# Patient Record
Sex: FEMALE | Race: BLACK OR AFRICAN AMERICAN | NOT HISPANIC OR LATINO | Employment: FULL TIME | URBAN - METROPOLITAN AREA
[De-identification: names, ages, dates, MRNs, and addresses within clinical notes are randomized per-mention and may not be internally consistent; named-entity substitution may affect disease eponyms.]

---

## 2019-04-29 DIAGNOSIS — M25.541 ARTHRALGIA OF RIGHT HAND: Primary | ICD-10-CM

## 2019-10-31 ENCOUNTER — HOSPITAL ENCOUNTER (EMERGENCY)
Facility: HOSPITAL | Age: 54
Discharge: HOME | End: 2019-10-31
Attending: EMERGENCY MEDICINE
Payer: COMMERCIAL

## 2019-10-31 VITALS
RESPIRATION RATE: 16 BRPM | WEIGHT: 185 LBS | HEART RATE: 92 BPM | HEIGHT: 69 IN | BODY MASS INDEX: 27.4 KG/M2 | TEMPERATURE: 99 F | DIASTOLIC BLOOD PRESSURE: 69 MMHG | OXYGEN SATURATION: 98 % | SYSTOLIC BLOOD PRESSURE: 136 MMHG

## 2019-10-31 DIAGNOSIS — R05.9 COUGH: Primary | ICD-10-CM

## 2019-10-31 PROCEDURE — 99283 EMERGENCY DEPT VISIT LOW MDM: CPT

## 2019-10-31 PROCEDURE — 99284 PR EMERGENCY DEPT VISIT,LEVEL IV: ICD-10-PCS | Mod: ,,, | Performed by: EMERGENCY MEDICINE

## 2019-10-31 PROCEDURE — 99284 EMERGENCY DEPT VISIT MOD MDM: CPT | Mod: ,,, | Performed by: EMERGENCY MEDICINE

## 2019-10-31 RX ORDER — BENZONATATE 100 MG/1
100 CAPSULE ORAL 3 TIMES DAILY PRN
Qty: 20 CAPSULE | Refills: 0 | Status: SHIPPED | OUTPATIENT
Start: 2019-10-31 | End: 2019-11-10

## 2019-10-31 NOTE — ED NOTES
"Pt presents to ED with c/o left lower leg/calf pain. Pt has hx of DVT and states that the pain In similar. Pt has no obvious swelling to left lower leg with color being appropriate for pts ethnicity. Pt describes pain in calf as "pulling". Pt denies any problems with ambulating due to left calf pain. Pt denies CP/SOB.  "

## 2019-10-31 NOTE — ED PROVIDER NOTES
Source of History:  patient    Chief complaint:  Leg Pain (Pt c/o left calf pain and URI symptoms x 3 days. Denies fevers. Hx of DVT and taking eliquis at home. ) and URI      HPI:  Raya Becker is a 54 y.o. female presenting with left lower extremity cramping.  She notes that for approximately the last 3 days she has had intermittent cramping pain in the left leg worse with walking, better with rest, she has not take any medications for this.  She is concerned about a DVT given her history of DVTs in 2002 in 2004.  During that time she also noted swelling along with constant pain rather than the cramping intermittent pain she is experiencing now.  During that time she has also noted upper respiratory infection symptoms such as cough, runny nose. She denies any sore throat.  Denies any fevers or chills. She has not taken any cough medicine for this yet.  She is a daily smoker.    ROS: As per HPI and below:    General: No fever.  No chills.  Eyes: No visual changes.  Head: No headache.    Integument: No rashes or lesions.  Chest: No shortness of breath.  Cardiovascular: No chest pain.  Abdomen: No abdominal pain.  No nausea or vomiting.  Urinary: No abnormal urination.  Neurologic: No focal weakness.  No numbness.  Hematologic: No easy bruising.  Endocrine: No excessive thirst or urination.      Review of patient's allergies indicates:  No Known Allergies    No current facility-administered medications on file prior to encounter.      Current Outpatient Medications on File Prior to Encounter   Medication Sig Dispense Refill    apixaban (ELIQUIS) 5 mg Tab Take 5 mg by mouth 2 (two) times daily.         PMH:  As per HPI and below:  Past Medical History:   Diagnosis Date    DVT (deep venous thrombosis)     Pulmonary embolus      No past surgical history on file.    Social History     Socioeconomic History    Marital status: Unknown     Spouse name: Not on file    Number of children: Not on file    Years of  education: Not on file    Highest education level: Not on file   Occupational History    Not on file   Social Needs    Financial resource strain: Not on file    Food insecurity:     Worry: Not on file     Inability: Not on file    Transportation needs:     Medical: Not on file     Non-medical: Not on file   Tobacco Use    Smoking status: Not on file   Substance and Sexual Activity    Alcohol use: Not on file    Drug use: Not on file    Sexual activity: Not on file   Lifestyle    Physical activity:     Days per week: Not on file     Minutes per session: Not on file    Stress: Not on file   Relationships    Social connections:     Talks on phone: Not on file     Gets together: Not on file     Attends [employer] service: Not on file     Active member of club or organization: Not on file     Attends meetings of clubs or organizations: Not on file     Relationship status: Not on file   Other Topics Concern    Not on file   Social History Narrative    Not on file       History reviewed. No pertinent family history.    Physical Exam:    Vitals:    10/31/19 0444   BP: 136/69   Pulse: 92   Resp: 16   Temp: 98.7 °F (37.1 °C)     Appearance: No acute distress.  Skin: No rashes seen.  Good turgor.  No abrasions.  No ecchymoses.  Eyes: No conjunctival injection. EOMI, PERRL.  ENT: Oropharynx clear.    Chest: Clear to auscultation bilaterally.  Good air movement.  No wheezes.  No rhonchi.  Cardiovascular: Regular rate and rhythm.  No murmurs. No gallops. No rubs.  Abdomen: Soft.  Not distended.  Nontender.  No guarding.  No rebound. No Masses  Musculoskeletal: Good range of motion all joints.  No deformities.  Neck supple.  No meningismus.  Neurologic: Moves all extremities.  Normal sensation.  No facial droop.  Normal speech.    Mental Status:  Alert and oriented x 3.  Appropriate, conversant.          Laboratory Studies:  Labs Reviewed - No data to display    I decided to obtain the old medical records.  Reviewed  and summarized the old medical record and it showed history of DVT on Eliquis      Imaging Results    None         Medications Given:  Medications - No data to display    Discussed with:  Patient    MDM:    54 y.o. female  here with symptoms consistent with upper respiratory infection.  Stable vital signs, no fever, minimal cough, do not suspect pneumonia.  Recommended patient continue antitussives, antipyretics home.     As far as leg cramping, it is intermittent, not associated with swelling redness and I think the likelihood of a DVT is very low.  I recommend the patient monitor this for several days and if any symptoms worsen or redness, swelling, or constant pain of up and she should return for an ultrasound.  Otherwise follow up with the primary doctor.     Advised pt to follow up with PCP or return if concerning symptoms arise. Pt understands and agrees with plan. Will d/c home.            Diagnostic Impression:    1. Cough               Krishna Perkins MD  10/31/19 0529

## 2020-12-14 ENCOUNTER — CLINICAL SUPPORT (OUTPATIENT)
Dept: REHABILITATION | Facility: OTHER | Age: 55
End: 2020-12-14
Payer: COMMERCIAL

## 2020-12-14 DIAGNOSIS — R29.898 WEAKNESS OF BOTH LOWER EXTREMITIES: ICD-10-CM

## 2020-12-14 DIAGNOSIS — M54.50 CHRONIC RIGHT-SIDED LOW BACK PAIN WITHOUT SCIATICA: ICD-10-CM

## 2020-12-14 DIAGNOSIS — G89.29 CHRONIC RIGHT-SIDED LOW BACK PAIN WITHOUT SCIATICA: ICD-10-CM

## 2020-12-14 PROCEDURE — 97110 THERAPEUTIC EXERCISES: CPT | Mod: PN

## 2020-12-14 PROCEDURE — 97161 PT EVAL LOW COMPLEX 20 MIN: CPT | Mod: PN

## 2020-12-14 NOTE — PLAN OF CARE
[facility]  Physical Therapy Initial Evaluation    Name: Raya Becker  Clinic Number: [id number]    Therapy Diagnosis:   Encounter Diagnoses   Name Primary?    Chronic right-sided low back pain without sciatica     Weakness of both lower extremities      Physician: Nadia Son FNP    Physician Orders: PT Eval and Treat   Medical Diagnosis from Referral: M54.5 (ICD-10-CM) - Low back pain   Evaluation Date: 12/14/2020  Authorization Period Expiration: 12/31/2020   Plan of Care Expiration: 3/12/2021  Visit # / Visits authorized: 1/ 12    Time In: 8:50  Time Out: 9:30 am  Total Billable Time: 40 minutes    Precautions: Standard , hx of DVTs    Subjective   Date of onset: July 2020  History of current condition - Raya reports: R sides LBP since July. She moved in July and feels that this may be the cause. It's been about the same, variable at times. Pain is mostly in low, low back and buttocks area, does not radiate down legs.    Denies B/B changes.        Past Medical History:   Diagnosis Date    DVT (deep venous thrombosis)     Pulmonary embolus      Raya Becker  has no past surgical history on file.    Raya has a current medication list which includes the following prescription(s): apixaban.    Review of patient's allergies indicates:  No Known Allergies     Imaging, no relevant imaging    Prior Therapy: none  Social History: lives alone  Occupation: cook, full time, on her feet walking around all day  Prior Level of Function: I with ADLs, active at work but no formal exercise  Current Level of Function: difficulty rising from a chair and bending due to pain, full duty at work    Pain:  Current 3/10, worst 6/10, best 0/10   Location: R low back, buttock, SIJ area  Description: Aching and Sharp  Aggravating Factors: Getting out of bed/chair  Easing Factors: Tylenol (doesn't help much)    Pts goals: reduce pain    Objective     Observation: Pt is alert and oriented, good  "historian    Posture:  Seated with L lateral lean, PPT during subjective    Lumbar Range of Motion:    Percent WFL Pain   Flexion  100%    achey at end range        Extension  25%    achey        Left Side Bending  to 2" above knee         Right Side Bending  to 2" above knee achey        Left rotation    75%         Right Rotation    50%              Lower Extremity MMT  MMT Left Right   Ankle Plantarflexion   Able to perform 10 B heel raises  Able to perform 10 B heel raises   Ankle Dorsiflexion   4/5   4/5   Knee Extension 5/5, weakness observed functionally with squat, see below 5/5   Knee Flexion 5/5 5/5   Hip Flexion  4/5   3+/5   Hip External Rotation  4/5   4/5   Hip Internal Rotation   4/5   4/5   Hip Abduction   3/5   3/5   Hip Extension   Able to perform B glute bridge with decreased ROM (abut 1 inch clearance)   Able to perform B glute bridge with decreased ROM (abut 1 inch clearance)       SI cluster  · ASIS Distraction: unable to tolerate pressure at ASIS  · ASIS compression: (-)  · Posterior thigh thrust: (-)  · Sacral compression: (-)  · MARY: (-)  PSIS even B    Squat:  Decreased ROM (45 degrees), wt shift to L, denies pain    SLS:  Able to hold > 15s B with unsteadiness does not require UE support    Neuro Dynamic Testing:    Sciatic nerve:      SLR: R = (-)     L = (-)      Joint Mobility: assess lumbar spine mobility at follow up, not assessed today due to time    Palpation: pt endorses mild tenderness with heavy pressure to R piriformis    Sensation: grossly intact BLE    Flexibility:    Hamstring: R = no restriction; L = no restriction        CMS Impairment/Limitation/Restriction for FOTO Lumbar Survey    Therapist reviewed FOTO scores for Raya Becker on 12/14/2020.   FOTO documents entered into [employer] - see Media section.    Limitation Score: % FOTO not filled out today. Will complete at f/u appointment    Goal: %         TREATMENT   Treatment Time In: 9:20 am  Treatment Time Out: " 9:30  Total Treatment time separate from Evaluation: 10 minutes    Raya received therapeutic exercises to develop strength, endurance, ROM, flexibility, posture and core stabilization for 10 minutes including:  Demonstration and initiation of HEP, including  Shotgun technique  LTR  glute bridge (pain at first trial, improved with PPT)  Clamshells  hooklying pelvic tilts      Home Exercises and Patient Education Provided    Education provided:   - exercise technique and rationale    Written Home Exercises Provided: yes.  Exercises were reviewed and Raya was able to demonstrate them prior to the end of the session.  Raya demonstrated good  understanding of the education provided.     See EMR under Patient Instructions for exercises provided 12/14/2020.    Assessment   Raya is a 55 y.o. female referred to outpatient Physical Therapy with a medical diagnosis of M54.5 (ICD-10-CM) - Low back pain. Pt presents with low back pain in the region of the SIJ and gluteal region. SIJ testing was negative today. Pt presents with associated impairments including decreased ROM of lumbar spine, decreased strength of BLE with MMT as well as functional strength with squatting. Pt will benefit from skilled PT to address strength and ROM deficits and improve pt's tolerance to standing, walking and reaching.     Pt prognosis is Good.   Pt will benefit from skilled outpatient Physical Therapy to address the deficits stated above and in the chart below, provide pt/family education, and to maximize pt's level of independence.     Plan of care discussed with patient: Yes  Pt's spiritual, cultural and educational needs considered and patient is agreeable to the plan of care and goals as stated below:     Anticipated Barriers for therapy: none    Medical Necessity is demonstrated by the following  History  Co-morbidities and personal factors that may impact the plan of care Co-morbidities:   hx of DVTs    Personal Factors:   lifestyle      low   Examination  Body Structures and Functions, activity limitations and participation restrictions that may impact the plan of care Body Regions:   back  lower extremities  trunk    Body Systems:    ROM  strength  balance  gait  motor control  motor learning    Participation Restrictions:    walking  Standing   getting out of a chiar    Activity limitations:   Learning and applying knowledge  no deficits    General Tasks and Commands  no deficits    Communication  no deficits    Mobility  lifting and carrying objects  walking    Self care  looking after one's health    Domestic Life  shopping  cooking  doing house work (cleaning house, washing dishes, laundry)    Interactions/Relationships  no deficits    Life Areas  employment    Community and Social Life  community life  recreation and leisure         moderate   Clinical Presentation stable and uncomplicated low   Decision Making/ Complexity Score: low     Goals:  Short Term Goals (4 Weeks):   1. Pt will report reduction in pain of the lumbar spine and LE to <=5/10 at worst for ease with ADL's  2. PT will demonstrate improved upright posture with minimal cuing for ease with functional positioning in home and community.  3. Pt will demonstrate improved lumbar spine AROM in all directions by 10% for ease with bending activities.  4. Pt will be able to perform squat to chair height with good mechanics to improve body mechanics with functional sit to stands.    Long Term Goals (12 Weeks):   1. Pt will report being independent with HEP for maintenance of improvements gained during therapy sessions  2. PT will report reduction of pain of the back and LE to <=3/10 for ease with standing/walking at work.   3. Pt will demonstrate trunk and extremity strength to >=4/5 without the provocation of pain for ease with walking for 8 hours/day at work.  4. Pt will demonstrate appropriate upright posture without external cueing for ease with functional mobility within her  home.   5. Pt to demonstrate improved functional ability with FOTO limitation <= % disability. (update goal once FOTO is completed)    Plan   Plan of care Certification: 12/14/2020 to 3/12/2021.    Outpatient Physical Therapy 2 times weekly for 12 weeks to include the following interventions: Cervical/Lumbar Traction, Electrical Stimulation  , Gait Training, Iontophoresis (with  ), Manual Therapy, Moist Heat/ Ice, Neuromuscular Re-ed, Patient Education, Self Care, Therapeutic Activites and Therapeutic Exercise.     Domenica Guerrero, PT

## 2020-12-28 ENCOUNTER — CLINICAL SUPPORT (OUTPATIENT)
Dept: REHABILITATION | Facility: OTHER | Age: 55
End: 2020-12-28
Payer: COMMERCIAL

## 2020-12-28 DIAGNOSIS — R29.898 WEAKNESS OF BOTH LOWER EXTREMITIES: ICD-10-CM

## 2020-12-28 DIAGNOSIS — M54.50 CHRONIC RIGHT-SIDED LOW BACK PAIN WITHOUT SCIATICA: Primary | ICD-10-CM

## 2020-12-28 DIAGNOSIS — G89.29 CHRONIC RIGHT-SIDED LOW BACK PAIN WITHOUT SCIATICA: Primary | ICD-10-CM

## 2020-12-28 PROCEDURE — 97110 THERAPEUTIC EXERCISES: CPT | Mod: PN | Performed by: PHYSICAL THERAPIST

## 2020-12-28 NOTE — PROGRESS NOTES
"    Physical Therapy Treatment Note     Name: Raya Becker  Clinic Number: [id number]    Therapy Diagnosis:   Encounter Diagnoses   Name Primary?    Chronic right-sided low back pain without sciatica Yes    Weakness of both lower extremities      Physician: Nadia Son FNP    Visit Date: 12/28/2020    Physician Orders: PT Eval and Treat   Medical Diagnosis from Referral: M54.5 (ICD-10-CM) - Low back pain   Evaluation Date: 12/14/2020  Authorization Period Expiration: 12/31/2020   Plan of Care Expiration: 3/12/2021  Visit # / Visits authorized: 2/ 12    Time In: 0830  Time Out: 0915  Total Billable Time: 45 minutes    Precautions: Standard , hx of DVTs     Subjective     Pt reports: she has done her exercises, but not as often as she should as her granddaughter was in the hospital. She says she is not going to be able to attend therapy regularly due to high co-pay.  She was compliant with home exercise program.  Response to previous treatment: no change  Functional change: no change    Pain: 2/10  Location: R low back, buttock, SIJ area     Objective         CMS Impairment/Limitation/Restriction for FOTO Lumbar Spine Survey    Therapist reviewed FOTO scores for Raya Becker on 12/28/2020.   FOTO documents entered into [employer] - see Media section.    Evaluation: 32%    Goal: 24%           Raya received therapeutic exercises to develop strength, endurance, ROM, flexibility, posture and core stabilization for 45 minutes including:  TrA 5" x 2 min  PPT 5" x 2 min  Bridge with PPT 2 x 10  BKTC on ball x 3 min  LTR on ball x 3 min  Piriformis stretch 3 x 30"  SL clam 5" x 20          Home Exercises Provided and Patient Education Provided     Education provided:   - Therapy rationale and plan of care    Written Home Exercises Provided: yes.  Exercises were reviewed and Raya was able to demonstrate them prior to the end of the session.  Raya demonstrated good  understanding of the education provided.     See " EMR under Patient Instructions for exercises provided 12/14/2020.    Assessment     Overall good tolerance to treatment session. Heavy verbal cuing required indicative of poor compliance with HEP. Good training effect noted with verbal cuing with therex today.  As pt will not be able to attend therapy at recommended frequency, heavy education provided today regarding importance of regular performance of HEP.   Raya is progressing well towards her goals.   Pt prognosis is Good.     Pt will continue to benefit from skilled outpatient physical therapy to address the deficits listed in the problem list box on initial evaluation, provide pt/family education and to maximize pt's level of independence in the home and community environment.     Pt's spiritual, cultural and educational needs considered and pt agreeable to plan of care and goals.     Anticipated barriers to physical therapy: none    Goals: IE 12/14/2020  Short Term Goals (4 Weeks):   1. Pt will report reduction in pain of the lumbar spine and LE to <=5/10 at worst for ease with ADL's. Progressing, not met  2. PT will demonstrate improved upright posture with minimal cuing for ease with functional positioning in home and community. Progressing, not met  3. Pt will demonstrate improved lumbar spine AROM in all directions by 10% for ease with bending activities. Progressing, not met  4. Pt will be able to perform squat to chair height with good mechanics to improve body mechanics with functional sit to stands. Progressing, not met     Long Term Goals (12 Weeks):   1. Pt will report being independent with HEP for maintenance of improvements gained during therapy sessions. Progressing, not met  2. PT will report reduction of pain of the back and LE to <=3/10 for ease with standing/walking at work. Progressing, not met  3. Pt will demonstrate trunk and extremity strength to >=4/5 without the provocation of pain for ease with walking for 8 hours/day at work.  Progressing, not met  4. Pt will demonstrate appropriate upright posture without external cueing for ease with functional mobility within her home. Progressing, not met  5. Pt to demonstrate improved functional ability with FOTO limitation <= 24 % disability. Progressing, not met      Plan   Plan of care Certification: 12/14/2020 to 3/12/2021.     Continue plan of care with focus on core and hip strengthening. Due to financial considerations, pt not able to attend therapy at recommended frequency. Based on discussion today, assess HEP at next visit for possible discharge.     Debbie Dietrich, PT

## 2021-07-17 ENCOUNTER — OFFICE VISIT (OUTPATIENT)
Dept: URGENT CARE | Facility: CLINIC | Age: 56
End: 2021-07-17
Payer: COMMERCIAL

## 2021-07-17 VITALS
SYSTOLIC BLOOD PRESSURE: 121 MMHG | DIASTOLIC BLOOD PRESSURE: 76 MMHG | OXYGEN SATURATION: 96 % | HEART RATE: 90 BPM | WEIGHT: 185 LBS | TEMPERATURE: 98 F | BODY MASS INDEX: 27.4 KG/M2 | RESPIRATION RATE: 16 BRPM | HEIGHT: 69 IN

## 2021-07-17 DIAGNOSIS — B34.9 ACUTE VIRAL SYNDROME: Primary | ICD-10-CM

## 2021-07-17 DIAGNOSIS — R53.83 OTHER FATIGUE: ICD-10-CM

## 2021-07-17 DIAGNOSIS — Z20.822 EXPOSURE TO COVID-19 VIRUS: ICD-10-CM

## 2021-07-17 DIAGNOSIS — Z11.59 ENCOUNTER FOR SCREENING FOR OTHER VIRAL DISEASES: ICD-10-CM

## 2021-07-17 DIAGNOSIS — Z71.89 EDUCATED ABOUT COVID-19 VIRUS INFECTION: ICD-10-CM

## 2021-07-17 LAB
CTP QC/QA: YES
SARS-COV-2 RDRP RESP QL NAA+PROBE: NEGATIVE

## 2021-07-17 PROCEDURE — U0002: ICD-10-PCS | Mod: QW,S$GLB,, | Performed by: PHYSICIAN ASSISTANT

## 2021-07-17 PROCEDURE — 3008F PR BODY MASS INDEX (BMI) DOCUMENTED: ICD-10-PCS | Mod: CPTII,S$GLB,, | Performed by: PHYSICIAN ASSISTANT

## 2021-07-17 PROCEDURE — U0002 COVID-19 LAB TEST NON-CDC: HCPCS | Mod: QW,S$GLB,, | Performed by: PHYSICIAN ASSISTANT

## 2021-07-17 PROCEDURE — 99203 PR OFFICE/OUTPT VISIT, NEW, LEVL III, 30-44 MIN: ICD-10-PCS | Mod: S$GLB,,, | Performed by: PHYSICIAN ASSISTANT

## 2021-07-17 PROCEDURE — 99203 OFFICE O/P NEW LOW 30 MIN: CPT | Mod: S$GLB,,, | Performed by: PHYSICIAN ASSISTANT

## 2021-07-17 PROCEDURE — 3008F BODY MASS INDEX DOCD: CPT | Mod: CPTII,S$GLB,, | Performed by: PHYSICIAN ASSISTANT

## 2021-11-23 ENCOUNTER — HOSPITAL ENCOUNTER (OUTPATIENT)
Dept: RADIOLOGY | Facility: OTHER | Age: 56
Discharge: HOME | End: 2021-11-23
Attending: INTERNAL MEDICINE
Payer: COMMERCIAL

## 2021-11-23 DIAGNOSIS — Z12.31 BREAST CANCER SCREENING BY MAMMOGRAM: ICD-10-CM

## 2021-11-23 PROCEDURE — 77063 MAMMO DIGITAL SCREENING BILAT WITH TOMO: ICD-10-PCS | Mod: 26,,, | Performed by: RADIOLOGY

## 2021-11-23 PROCEDURE — 77067 MAMMO DIGITAL SCREENING BILAT WITH TOMO: ICD-10-PCS | Mod: 26,,, | Performed by: RADIOLOGY

## 2021-11-23 PROCEDURE — 77063 BREAST TOMOSYNTHESIS BI: CPT | Mod: 26,,, | Performed by: RADIOLOGY

## 2021-11-23 PROCEDURE — 77067 SCR MAMMO BI INCL CAD: CPT | Mod: 26,,, | Performed by: RADIOLOGY

## 2021-11-23 PROCEDURE — 77067 SCR MAMMO BI INCL CAD: CPT | Mod: TC

## 2022-12-29 DIAGNOSIS — F17.210 CIGARETTE NICOTINE DEPENDENCE, UNCOMPLICATED: Primary | ICD-10-CM

## 2022-12-29 DIAGNOSIS — R06.89 BREATH SOUNDS, ABNORMAL: Primary | ICD-10-CM

## 2023-01-03 ENCOUNTER — HOSPITAL ENCOUNTER (OUTPATIENT)
Dept: RADIOLOGY | Facility: OTHER | Age: 58
Discharge: HOME | End: 2023-01-03
Payer: COMMERCIAL

## 2023-01-03 DIAGNOSIS — F17.210 CIGARETTE NICOTINE DEPENDENCE, UNCOMPLICATED: ICD-10-CM

## 2023-01-03 PROCEDURE — 71271 CT CHEST LUNG SCREENING LOW DOSE: ICD-10-PCS | Mod: 26,,, | Performed by: RADIOLOGY

## 2023-01-03 PROCEDURE — 71271 CT THORAX LUNG CANCER SCR C-: CPT | Mod: 26,,, | Performed by: RADIOLOGY

## 2023-01-03 PROCEDURE — 71271 CT THORAX LUNG CANCER SCR C-: CPT | Mod: TC

## 2023-01-04 DIAGNOSIS — M62.830 BACK MUSCLE SPASM: Primary | ICD-10-CM

## 2023-01-09 ENCOUNTER — HOSPITAL ENCOUNTER (OUTPATIENT)
Dept: PULMONOLOGY | Facility: CLINIC | Age: 58
Discharge: HOME | End: 2023-01-09
Payer: COMMERCIAL

## 2023-01-09 DIAGNOSIS — R06.89 BREATH SOUNDS, ABNORMAL: ICD-10-CM

## 2023-01-09 DIAGNOSIS — R06.02 SOB (SHORTNESS OF BREATH): Primary | ICD-10-CM

## 2023-01-09 LAB
DLCO SINGLE BREATH LLN: 19.02
DLCO SINGLE BREATH PRE REF: 55 %
DLCO SINGLE BREATH REF: 24.75
DLCOC SBVA LLN: 3.21
DLCOC SBVA REF: 4.55
DLCOC SINGLE BREATH LLN: 19.02
DLCOC SINGLE BREATH REF: 24.75
DLCOCSBVAULN: 5.89
DLCOCSINGLEBREATHULN: 30.49
DLCOSINGLEBREATHULN: 30.49
DLCOVA LLN: 3.21
DLCOVA PRE REF: 83 %
DLCOVA PRE: 3.77 ML/(MIN*MMHG*L) (ref 3.21–5.89)
DLCOVA REF: 4.55
DLCOVAULN: 5.89
ERV LLN: -16449.15
ERV PRE REF: 106.3 %
ERV REF: 0.85
ERVULN: ABNORMAL
FEF 25 75 LLN: 0.94
FEF 25 75 PRE REF: 75.1 %
FEF 25 75 REF: 2.16
FET100 CHG: -3 %
FEV05 LLN: 1.15
FEV05 REF: 2
FEV1 CHG: -0.2 %
FEV1 FVC LLN: 68
FEV1 FVC PRE REF: 94.9 %
FEV1 FVC REF: 79
FEV1 LLN: 1.75
FEV1 PRE REF: 85 %
FEV1 REF: 2.39
FEV1 VOL CHG: -0
FRCPLETH LLN: 2.05
FRCPLETH PREREF: 97.3 %
FRCPLETH REF: 2.87
FRCPLETHULN: 3.69
FVC CHG: 0.7 %
FVC LLN: 2.24
FVC PRE REF: 89 %
FVC REF: 3.02
FVC VOL CHG: 0.02
IVC PRE: 2.36 L (ref 2.24–3.85)
IVC SINGLE BREATH LLN: 2.24
IVC SINGLE BREATH PRE REF: 78.1 %
IVC SINGLE BREATH REF: 3.02
IVCSINGLEBREATHULN: 3.85
LLN IC: -16447.61
PEF LLN: 3.97
PEF PRE REF: 76.5 %
PEF REF: 6.17
PHYSICIAN COMMENT: ABNORMAL
POST FEF 25 75: 1.52 L/S (ref 0.94–3.9)
POST FET 100: 7.08 SEC
POST FEV1 FVC: 74.73 % (ref 67.89–89.35)
POST FEV1: 2.03 L (ref 1.75–3)
POST FEV5: 1.68 L (ref 1.15–2.86)
POST FVC: 2.71 L (ref 2.24–3.85)
POST PEF: 5.51 L/S (ref 3.97–8.37)
PRE DLCO: 13.61 ML/(MIN*MMHG) (ref 19.02–30.49)
PRE ERV: 0.9 L (ref -16449.15–16450.85)
PRE FEF 25 75: 1.62 L/S (ref 0.94–3.9)
PRE FET 100: 7.3 SEC
PRE FEV05 REF: 83.1 %
PRE FEV1 FVC: 75.41 % (ref 67.89–89.35)
PRE FEV1: 2.03 L (ref 1.75–3)
PRE FEV5: 1.66 L (ref 1.15–2.86)
PRE FRC PL: 2.79 L (ref 2.05–3.69)
PRE FVC: 2.69 L (ref 2.24–3.85)
PRE IC: 1.79 L (ref -16447.61–16452.39)
PRE PEF: 4.72 L/S (ref 3.97–8.37)
PRE REF IC: 75.1 %
PRE RV: 1.89 L (ref 1.45–2.6)
PRE TLC: 4.58 L (ref 4.45–6.43)
PRE VTG: 2.9 L
RAW PRE REF: 108.6 %
RAW PRE: 3.32 CMH2O*S/L (ref 3.06–3.06)
RAW REF: 3.06
REF IC: 2.39
RV LLN: 1.45
RV PRE REF: 93.5 %
RV REF: 2.02
RVTLC LLN: 29
RVTLC PRE REF: 105.7 %
RVTLC PRE: 41.26 % (ref 29.43–48.61)
RVTLC REF: 39
RVTLCULN: 49
RVULN: 2.6
SGAW PRE REF: 97.2 %
SGAW PRE: 0.1 1/(CMH2O*S) (ref 0.1–0.1)
SGAW REF: 0.1
TLC LLN: 4.45
TLC PRE REF: 84.3 %
TLC REF: 5.44
TLC ULN: 6.43
ULN IC: ABNORMAL
VA PRE: 3.61 L (ref 5.29–5.29)
VA SINGLE BREATH LLN: 5.29
VA SINGLE BREATH PRE REF: 68.1 %
VA SINGLE BREATH REF: 5.29
VASINGLEBREATHULN: 5.29
VC LLN: 2.24
VC PRE REF: 89 %
VC PRE: 2.69 L (ref 2.24–3.85)
VC REF: 3.02
VC ULN: 3.85

## 2023-01-09 PROCEDURE — 94726 PULM FUNCT TST PLETHYSMOGRAP: ICD-10-PCS | Mod: S$GLB,,, | Performed by: INTERNAL MEDICINE

## 2023-01-09 PROCEDURE — 94729 PR C02/MEMBANE DIFFUSE CAPACITY: ICD-10-PCS | Mod: S$GLB,,, | Performed by: INTERNAL MEDICINE

## 2023-01-09 PROCEDURE — 94060 PR EVAL OF BRONCHOSPASM: ICD-10-PCS | Mod: S$GLB,,, | Performed by: INTERNAL MEDICINE

## 2023-01-09 PROCEDURE — 94729 DIFFUSING CAPACITY: CPT | Mod: S$GLB,,, | Performed by: INTERNAL MEDICINE

## 2023-01-09 PROCEDURE — 94060 EVALUATION OF WHEEZING: CPT | Mod: S$GLB,,, | Performed by: INTERNAL MEDICINE

## 2023-01-09 PROCEDURE — 94726 PLETHYSMOGRAPHY LUNG VOLUMES: CPT | Mod: S$GLB,,, | Performed by: INTERNAL MEDICINE

## 2023-04-04 ENCOUNTER — OFFICE VISIT (OUTPATIENT)
Dept: URGENT CARE | Facility: CLINIC | Age: 58
End: 2023-04-04
Payer: OTHER MISCELLANEOUS

## 2023-04-04 VITALS
HEIGHT: 69 IN | BODY MASS INDEX: 27.4 KG/M2 | DIASTOLIC BLOOD PRESSURE: 74 MMHG | HEART RATE: 78 BPM | OXYGEN SATURATION: 96 % | SYSTOLIC BLOOD PRESSURE: 123 MMHG | WEIGHT: 185 LBS | TEMPERATURE: 99 F | RESPIRATION RATE: 18 BRPM

## 2023-04-04 DIAGNOSIS — S61.217A LACERATION OF LEFT LITTLE FINGER WITHOUT FOREIGN BODY WITHOUT DAMAGE TO NAIL, INITIAL ENCOUNTER: Primary | ICD-10-CM

## 2023-04-04 DIAGNOSIS — F17.200 NEEDS SMOKING CESSATION EDUCATION: ICD-10-CM

## 2023-04-04 PROCEDURE — 99214 OFFICE O/P EST MOD 30 MIN: CPT | Mod: 25,S$GLB,, | Performed by: FAMILY MEDICINE

## 2023-04-04 PROCEDURE — 99214 PR OFFICE/OUTPT VISIT, EST, LEVL IV, 30-39 MIN: ICD-10-PCS | Mod: 25,S$GLB,, | Performed by: FAMILY MEDICINE

## 2023-04-04 PROCEDURE — 12041 LACERATION REPAIR: ICD-10-PCS | Mod: S$GLB,,, | Performed by: FAMILY MEDICINE

## 2023-04-04 PROCEDURE — 12041 INTMD RPR N-HF/GENIT 2.5CM/<: CPT | Mod: S$GLB,,, | Performed by: FAMILY MEDICINE

## 2023-04-04 NOTE — PATIENT INSTRUCTIONS
Return in 7-10 days for suture removal      You must understand that you have received treatment at an Urgent Care facility only, and that you may be  released before all of your medical problems are known or treated. Urgent Care facilities are not equipped to  handle life threatening emergencies. It is recommended that you seek care at an Emergency Department for  further evaluation of worsening or concerning symptoms, or possibly life threatening conditions as  discussed.      If you develop chest pain, shortness of breath, throat swelling, tongue swelling, lightheadedness or any other causes for concern, proceed to ER.

## 2023-04-04 NOTE — PROCEDURES
Laceration Repair    Date/Time: 4/4/2023 11:10 AM  Performed by: Mohini Malloy MD  Authorized by: Mohini Malloy MD   Consent Done: Yes  Consent: Verbal consent obtained. Written consent not obtained.  Risks and benefits: risks, benefits and alternatives were discussed  Consent given by: patient  Patient identity confirmed: verbally with patient  Body area: upper extremity  Location details: left small finger  Laceration length: 1 cm  Contamination: The wound is contaminated.  Foreign bodies: no foreign bodies  Anesthesia: digital block    Anesthesia:  Local Anesthetic: lidocaine 2% without epinephrine  Anesthetic total: 4 mL    Patient sedated: no  Preparation: Patient was prepped and draped in the usual sterile fashion.  Irrigation solution: saline  Amount of cleaning: standard  Debridement: none  Degree of undermining: none  Skin closure: 4-0 Prolene  Number of sutures: 3  Technique: simple  Approximation: close  Approximation difficulty: simple  Patient tolerance: Patient tolerated the procedure well with no immediate complications

## 2023-04-04 NOTE — PROGRESS NOTES
"Subjective:      Patient ID: Raya Becker is a 57 y.o. female.    Vitals:  height is 5' 9" (1.753 m) and weight is 83.9 kg (185 lb). Her temperature is 98.7 °F (37.1 °C). Her blood pressure is 123/74 and her pulse is 78. Her respiration is 18 and oxygen saturation is 96%.     Chief Complaint: Laceration    Pt states laceration to left 5th finger at work today.  Pt states she was slicing tomatoes and cut her finger.    Pt is a 58 yo F who presents with laceration to the distal left 5th digit on the palmar surface after cutting a tomato about 1 hour ago.  Pressure was applied but wound was not cleaned    Laceration   The incident occurred less than 1 hour ago. The laceration is located on the Left hand. The laceration mechanism was a dirty knife. The pain is at a severity of 4/10. Her tetanus status is out of date.     Constitution: Negative for fever.   Skin:  Positive for laceration. Negative for erythema and bruising.    Objective:     Physical Exam   Constitutional: She is oriented to person, place, and time. She appears well-developed.   HENT:   Head: Normocephalic and atraumatic. Head is without abrasion, without contusion and without laceration.   Ears:   Right Ear: External ear normal.   Left Ear: External ear normal.   Nose: Nose normal.   Mouth/Throat: Oropharynx is clear and moist and mucous membranes are normal.   Eyes: Conjunctivae, EOM and lids are normal. Pupils are equal, round, and reactive to light.   Neck: Trachea normal and phonation normal. Neck supple.   Cardiovascular: Normal rate, regular rhythm and normal heart sounds.   Pulmonary/Chest: Effort normal and breath sounds normal. No stridor. No respiratory distress.   Musculoskeletal: Normal range of motion.         General: Normal range of motion.      Left hand: She exhibits laceration.   Neurological: She is alert and oriented to person, place, and time.   Skin: Skin is warm, dry, intact and no rash. Capillary refill takes less than " 2 seconds. Lacerations - upper ext.:  left handNo abrasion, No burn, No bruising, No erythema and No ecchymosis        Psychiatric: Her speech is normal and behavior is normal. Judgment and thought content normal.   Nursing note and vitals reviewed.    Assessment:     1. Laceration of left little finger without foreign body without damage to nail, initial encounter        Plan:       Laceration of left little finger without foreign body without damage to nail, initial encounter  -     Ambulatory referral/consult to Hand Surgery      Laceration repaired with 4-0 prolene. 3 placed.  Follow-up in 7-10 days for removal          Patient Instructions   Return in 7-10 days for suture removal      You must understand that you have received treatment at an Urgent Care facility only, and that you may be  released before all of your medical problems are known or treated. Urgent Care facilities are not equipped to  handle life threatening emergencies. It is recommended that you seek care at an Emergency Department for  further evaluation of worsening or concerning symptoms, or possibly life threatening conditions as  discussed.      If you develop chest pain, shortness of breath, throat swelling, tongue swelling, lightheadedness or any other causes for concern, proceed to ER.

## 2023-04-14 ENCOUNTER — CLINICAL SUPPORT (OUTPATIENT)
Dept: URGENT CARE | Facility: CLINIC | Age: 58
End: 2023-04-14
Payer: COMMERCIAL

## 2023-04-14 VITALS
WEIGHT: 185 LBS | DIASTOLIC BLOOD PRESSURE: 76 MMHG | HEIGHT: 69 IN | RESPIRATION RATE: 19 BRPM | BODY MASS INDEX: 27.4 KG/M2 | SYSTOLIC BLOOD PRESSURE: 120 MMHG | HEART RATE: 86 BPM | TEMPERATURE: 98 F | OXYGEN SATURATION: 96 %

## 2023-04-14 DIAGNOSIS — Z48.02 VISIT FOR SUTURE REMOVAL: Primary | ICD-10-CM

## 2023-04-14 PROCEDURE — 99499 NO LOS: ICD-10-PCS | Mod: S$GLB,,, | Performed by: NURSE PRACTITIONER

## 2023-04-14 PROCEDURE — 99499 UNLISTED E&M SERVICE: CPT | Mod: S$GLB,,, | Performed by: NURSE PRACTITIONER

## 2023-04-14 PROCEDURE — 99024 POSTOP FOLLOW-UP VISIT: CPT | Mod: S$GLB,,, | Performed by: NURSE PRACTITIONER

## 2023-04-14 PROCEDURE — 99024 SUTURE REMOVAL: ICD-10-PCS | Mod: S$GLB,,, | Performed by: NURSE PRACTITIONER

## 2023-04-14 RX ORDER — METHOCARBAMOL 750 MG/1
TABLET, FILM COATED ORAL
COMMUNITY
Start: 2023-01-03

## 2023-04-14 RX ORDER — PROMETHAZINE HYDROCHLORIDE AND DEXTROMETHORPHAN HYDROBROMIDE 6.25; 15 MG/5ML; MG/5ML
SYRUP ORAL
COMMUNITY
Start: 2022-11-28

## 2023-04-14 RX ORDER — DICLOFENAC SODIUM 10 MG/G
GEL TOPICAL
COMMUNITY
Start: 2022-12-27

## 2023-04-14 RX ORDER — FLUTICASONE PROPIONATE 50 MCG
SPRAY, SUSPENSION (ML) NASAL
COMMUNITY
Start: 2022-11-28

## 2023-04-14 RX ORDER — CYCLOBENZAPRINE HCL 10 MG
10 TABLET ORAL
COMMUNITY
Start: 2022-12-27

## 2023-04-14 RX ORDER — CETIRIZINE HYDROCHLORIDE 10 MG/1
TABLET ORAL
COMMUNITY
Start: 2022-11-28

## 2023-04-14 NOTE — PROCEDURES
Suture Removal    Date/Time: 4/14/2023 3:15 PM  Location procedure was performed: [facility]  Performed by: Art Ramsey NP  Authorized by: Art Ramsey NP   Pre-operative diagnosis: Laceration  Post-operative diagnosis: Laceration  Body area: upper extremity  Location details: left small finger  Description of findings: Good   Wound Appearance: clean, well healed, normal color, nontender, no drainage and nonpurulent  Sutures Removed: 3  Post-removal: no dressing applied  Complications: No  Estimated blood loss (mL): 0  Specimens: No  Implants: No

## 2023-04-14 NOTE — PROGRESS NOTES
"Subjective:      Patient ID: Raya Becker is a 57 y.o. female.    Vitals:  height is 5' 9" (1.753 m) and weight is 83.9 kg (185 lb). Her temperature is 98.3 °F (36.8 °C). Her blood pressure is 120/76 and her pulse is 86. Her respiration is 19 and oxygen saturation is 96%.     Chief Complaint: Suture / Staple Removal (Left Hand Pinky Finger )    Pt present for suture removal on her left hand pinky finger that were placed X 10 days.     Provider note begins below:    Patient comes to clinic today removal of sutures placed here 10 days ago to distal left 5th digit.  There are 3 Prolene sutures in place.  Laceration is well approximated, scabbed and healed.  No sign of redness, fever or purulence.  Patient denies pain.    Suture / Staple Removal  The sutures were placed 7 to 10 days ago. She tried regular soap and water washings since the wound repair. The treatment provided significant relief. There has been no drainage from the wound. There is no redness present. There is no swelling present. There is no pain (Area is tender) present. She has no difficulty moving the affected extremity or digit.     Skin:  Negative for erythema.    Objective:     Physical Exam   Constitutional: She is oriented to person, place, and time. She appears well-developed.   HENT:   Head: Normocephalic and atraumatic. Head is without abrasion, without contusion and without laceration.   Ears:   Right Ear: External ear normal.   Left Ear: External ear normal.   Nose: Nose normal.   Mouth/Throat: Oropharynx is clear and moist and mucous membranes are normal.   Eyes: Conjunctivae, EOM and lids are normal. Pupils are equal, round, and reactive to light.   Neck: Trachea normal and phonation normal. Neck supple.   Cardiovascular: Normal rate.   Pulmonary/Chest: Effort normal.   Musculoskeletal: Normal range of motion.         General: Normal range of motion.   Neurological: She is alert and oriented to person, place, and time.   Skin: " Skin is warm, dry and no rash. Capillary refill takes less than 2 seconds. No abrasion, No burn, No bruising, No erythema and No ecchymosis        Psychiatric: Her speech is normal and behavior is normal. Judgment and thought content normal.   Nursing note and vitals reviewed.    Assessment:     1. Visit for suture removal        Plan:       Visit for suture removal  -     Suture Removal    Suture Removal    Date/Time: 4/14/2023 3:15 PM  Location procedure was performed: [facility]  Performed by: Art Ramsey NP  Authorized by: Art Ramsey NP   Pre-operative diagnosis: Laceration  Post-operative diagnosis: Laceration  Body area: upper extremity  Location details: left small finger  Description of findings: Good   Wound Appearance: clean, well healed, normal color, nontender, no drainage and nonpurulent  Sutures Removed: 3  Post-removal: no dressing applied  Complications: No  Estimated blood loss (mL): 0  Specimens: No  Implants: No

## 2023-04-24 ENCOUNTER — CLINICAL SUPPORT (OUTPATIENT)
Dept: SMOKING CESSATION | Facility: CLINIC | Age: 58
End: 2023-04-24

## 2023-04-24 DIAGNOSIS — F17.200 NICOTINE DEPENDENCE: Primary | ICD-10-CM

## 2023-04-24 PROCEDURE — 99999 PR PBB SHADOW E&M-EST. PATIENT-LVL I: ICD-10-PCS | Mod: PBBFAC,,,

## 2023-04-24 PROCEDURE — 99404 PR PREVENT COUNSEL,INDIV,60 MIN: ICD-10-PCS | Mod: S$GLB,,,

## 2023-04-24 PROCEDURE — 99999 PR PBB SHADOW E&M-EST. PATIENT-LVL I: CPT | Mod: PBBFAC,,,

## 2023-04-24 PROCEDURE — 99404 PREV MED CNSL INDIV APPRX 60: CPT | Mod: S$GLB,,,

## 2023-04-24 RX ORDER — IBUPROFEN 200 MG
1 TABLET ORAL DAILY
Qty: 14 PATCH | Refills: 0 | Status: SHIPPED | OUTPATIENT
Start: 2023-04-24

## 2023-04-24 NOTE — PROGRESS NOTES
Patient was seen in clinic today and reports smoking 1 pack of cigarettes per day. Patient will begin biweekly tobacco cessation sessions and a tobacco cessation medication regimen of 21mg nicotine patch. Patient states that she smokes in the home and car, encouraged patient to try to smoke outside moving forward. Discussed other tips and strategies to assist with quit attempt. The patient will continue with  therapy sessions and medication monitoring by CTTS. Prescribed medication management will be by physician.

## 2023-07-20 ENCOUNTER — TELEPHONE (OUTPATIENT)
Dept: SMOKING CESSATION | Facility: CLINIC | Age: 58
End: 2023-07-20
Payer: COMMERCIAL

## 2023-08-02 ENCOUNTER — CLINICAL SUPPORT (OUTPATIENT)
Dept: SMOKING CESSATION | Facility: CLINIC | Age: 58
End: 2023-08-02

## 2023-08-02 DIAGNOSIS — F17.200 NICOTINE DEPENDENCE: Primary | ICD-10-CM

## 2023-08-02 PROCEDURE — 99999 PR PBB SHADOW E&M-EST. PATIENT-LVL I: CPT | Mod: PBBFAC,,,

## 2023-08-02 PROCEDURE — 99407 PR TOBACCO USE CESSATION INTENSIVE >10 MINUTES: ICD-10-PCS | Mod: S$GLB,,,

## 2023-08-02 PROCEDURE — 99999 PR PBB SHADOW E&M-EST. PATIENT-LVL I: ICD-10-PCS | Mod: PBBFAC,,,

## 2023-08-02 PROCEDURE — 99407 BEHAV CHNG SMOKING > 10 MIN: CPT | Mod: S$GLB,,,

## 2023-08-02 NOTE — PROGRESS NOTES
Called pt to f/u on her 3 month smoking cessation quit status. Pt stated she is still smoking. Informed her she has benefits available and is able to rejoin. Pt not ready to make appointment. She will call back when ready. Informed her of benefit period, phone follow ups, and contact information. Will complete smart form and will continue to follow up on quit #1 episode.

## 2024-04-03 ENCOUNTER — CLINICAL SUPPORT (OUTPATIENT)
Dept: SMOKING CESSATION | Facility: CLINIC | Age: 59
End: 2024-04-03

## 2024-04-03 DIAGNOSIS — F17.200 NICOTINE DEPENDENCE: Primary | ICD-10-CM

## 2024-04-03 PROCEDURE — 99999 PR PBB SHADOW E&M-EST. PATIENT-LVL I: CPT | Mod: PBBFAC,,,

## 2024-04-03 NOTE — PROGRESS NOTES
Spoke with patient today in regard to smoking cessation progress 6/12 month telephone follow up, she states that she is not tobacco free.  Patient states she smokes a pack of cigarettes daily.  Informed patient of benefit period, future follow up, and contact information if any further help or support is needed.  Will complete smart form for 6/12 month follow up on Quit attempt #1 and resolve episode.

## 2025-03-14 ENCOUNTER — OFFICE VISIT (OUTPATIENT)
Dept: URGENT CARE | Facility: CLINIC | Age: 60
End: 2025-03-14
Payer: OTHER MISCELLANEOUS

## 2025-03-14 VITALS
BODY MASS INDEX: 27.4 KG/M2 | HEIGHT: 69 IN | RESPIRATION RATE: 17 BRPM | DIASTOLIC BLOOD PRESSURE: 68 MMHG | TEMPERATURE: 98 F | SYSTOLIC BLOOD PRESSURE: 120 MMHG | WEIGHT: 185 LBS | OXYGEN SATURATION: 98 % | HEART RATE: 81 BPM

## 2025-03-14 DIAGNOSIS — Z02.83 ENCOUNTER FOR DRUG SCREENING: Primary | ICD-10-CM

## 2025-03-14 DIAGNOSIS — S61.210A LACERATION OF RIGHT INDEX FINGER WITHOUT FOREIGN BODY WITHOUT DAMAGE TO NAIL, INITIAL ENCOUNTER: ICD-10-CM

## 2025-03-14 DIAGNOSIS — Z02.6 ENCOUNTER RELATED TO WORKER'S COMPENSATION CLAIM: ICD-10-CM

## 2025-03-14 RX ORDER — CEPHALEXIN 500 MG/1
500 CAPSULE ORAL 4 TIMES DAILY
Qty: 28 CAPSULE | Refills: 0 | Status: SHIPPED | OUTPATIENT
Start: 2025-03-14 | End: 2025-03-21

## 2025-03-14 RX ORDER — TIMOLOL HEMIHYDRATE 5 MG/ML
1 SOLUTION/ DROPS OPHTHALMIC
COMMUNITY

## 2025-03-14 RX ORDER — MUPIROCIN 20 MG/G
OINTMENT TOPICAL
Qty: 22 G | Refills: 1 | Status: SHIPPED | OUTPATIENT
Start: 2025-03-14

## 2025-03-14 RX ORDER — MUPIROCIN CALCIUM 20 MG/G
CREAM TOPICAL
Status: COMPLETED | OUTPATIENT
Start: 2025-03-14 | End: 2025-03-14

## 2025-03-14 RX ORDER — LATANOPROST 50 UG/ML
1 SOLUTION/ DROPS OPHTHALMIC NIGHTLY
COMMUNITY
Start: 2025-01-20

## 2025-03-14 RX ADMIN — MUPIROCIN CALCIUM: 20 CREAM TOPICAL at 04:03

## 2025-03-14 NOTE — PROGRESS NOTES
Subjective:      Patient ID: Raya Becker is a 59 y.o. female.    Chief Complaint: Laceration (Presenting 3 hours post sustaining a laceration to the right second digit and a non-DOT 5 drug screen)    Presenting 3 hours post sustaining a laceration to the right second digit and a non-DOT 5 drug screen. Patient was sharpening a knife when the knife slipped and she cut her second digit on the right extremity. Hemostasis present on arrival. Tdap last administered in 2023.    Laceration   The incident occurred 3 to 6 hours ago. The laceration is located on the Right hand. The laceration is 4 cm (Approximately) in size. The laceration mechanism was a clean knife. The patient is experiencing no pain. Her tetanus status is UTD.     Skin:  Positive for laceration.     Objective:     Physical Exam  Vitals and nursing note reviewed.   Constitutional:       Appearance: She is well-developed.   HENT:      Head: Normocephalic and atraumatic. No abrasion, contusion or laceration.      Right Ear: External ear normal.      Left Ear: External ear normal.      Nose: Nose normal.   Eyes:      General: Lids are normal.      Conjunctiva/sclera: Conjunctivae normal.      Pupils: Pupils are equal, round, and reactive to light.   Neck:      Trachea: Trachea and phonation normal.   Cardiovascular:      Rate and Rhythm: Normal rate and regular rhythm.      Heart sounds: Normal heart sounds.   Pulmonary:      Effort: Pulmonary effort is normal. No respiratory distress.      Breath sounds: Normal breath sounds. No stridor.   Musculoskeletal:         General: Normal range of motion.      Cervical back: Full passive range of motion without pain and neck supple.   Skin:     General: Skin is warm and dry.      Capillary Refill: Capillary refill takes less than 2 seconds.      Findings: Laceration present. No abrasion, bruising or ecchymosis.          Neurological:      Mental Status: She is alert and oriented to person, place, and time.    Psychiatric:         Speech: Speech normal.         Behavior: Behavior normal.         Thought Content: Thought content normal.         Judgment: Judgment normal.        Assessment:      1. Encounter for drug screening    2. Encounter related to worker's compensation claim    3. Laceration of right index finger without foreign body without damage to nail, initial encounter      Plan:       Medications Ordered This Encounter   Medications    cephALEXin (KEFLEX) 500 MG capsule     Sig: Take 1 capsule (500 mg total) by mouth 4 (four) times daily. for 7 days     Dispense:  28 capsule     Refill:  0    mupirocin (BACTROBAN) 2 % ointment     Sig: Apply to affected area 3 times daily     Dispense:  22 g     Refill:  1    mupirocin calcium 2% cream            No follow-ups on file.    Return here in 5-7 days to have sutures removed  Can not return to work until follow up with Occupational health\    Wash hands with soap and water mupirocin daily. Keep covered while working, out to air when not. Protect from injury    Ed for any concerns

## 2025-03-14 NOTE — PROCEDURES
"Laceration Repair    Date/Time: 3/14/2025 3:05 PM    Performed by: Vee Bailey NP  Authorized by: Vee Bailey NP  Consent Done: Yes  Consent: Verbal consent obtained  Risks and benefits: risks, benefits and alternatives were discussed  Consent given by: patient  Patient understanding: patient states understanding of the procedure being performed  Patient consent: the patient's understanding of the procedure matches consent given  Site marked: the operative site was marked  Patient identity confirmed:  and name  Time out: Immediately prior to procedure a "time out" was called to verify the correct patient, procedure, equipment, support staff and site/side marked as required.  Body area: upper extremity  Location details: right index finger  Laceration length: 3 cm  Foreign bodies: no foreign bodies  Tendon involvement: none  Nerve involvement: none  Vascular damage: no  Anesthesia: nerve block    Anesthesia:  Local Anesthetic: lidocaine 1% without epinephrine  Anesthetic total: 3 mL  Preparation: Patient was prepped and draped in the usual sterile fashion.  Irrigation solution: saline  Irrigation method: syringe  Amount of cleaning: standard  Debridement: none  Degree of undermining: none  Skin closure: 5-0 Prolene  Number of sutures: 4  Approximation: close  Approximation difficulty: simple  Dressing: 4x4 sterile gauze, pressure dressing and antibiotic ointment  Patient tolerance: Patient tolerated the procedure well with no immediate complications    "

## 2025-03-14 NOTE — PATIENT INSTRUCTIONS
Return here in 5-7 days to have sutures removed  Can not return to work until follow up with Occupational health\    Wash hands with soap and water mupirocin daily. Keep covered while working, out to air when not. Protect from injury    Ed for any concerns

## 2025-03-14 NOTE — LETTER
[facility] [address]  [facility] [address]  Phone: [phone]  Fax: [phone]  [facility]: [id number]    Pt Name: Raya Becker  Injury Date:     Employee ID:  Date of First Treatment: 03/14/2025   Company: [employer]      Appointment Time: 02:50 PM Arrived: 3:05pm   Provider: Vee Mcleod NP Time Out:4:30pm     Office Treatment:   1. Encounter for drug screening    2. Encounter related to worker's compensation claim    3. Laceration of right index finger without foreign body without damage to nail, initial encounter      Medications Ordered This Encounter   Medications    cephALEXin (KEFLEX) 500 MG capsule    mupirocin (BACTROBAN) 2 % ointment    mupirocin calcium 2% cream                 Return Appointment:     Please follow up with   [facility] - [address] [address]    9:00am - 4:30pm

## 2025-03-18 ENCOUNTER — OFFICE VISIT (OUTPATIENT)
Dept: URGENT CARE | Facility: CLINIC | Age: 60
End: 2025-03-18
Payer: OTHER MISCELLANEOUS

## 2025-03-18 VITALS
WEIGHT: 185 LBS | DIASTOLIC BLOOD PRESSURE: 72 MMHG | BODY MASS INDEX: 27.4 KG/M2 | TEMPERATURE: 98 F | HEIGHT: 69 IN | OXYGEN SATURATION: 98 % | RESPIRATION RATE: 18 BRPM | HEART RATE: 92 BPM | SYSTOLIC BLOOD PRESSURE: 127 MMHG

## 2025-03-18 DIAGNOSIS — S61.210A LACERATION OF RIGHT INDEX FINGER WITHOUT FOREIGN BODY WITHOUT DAMAGE TO NAIL, INITIAL ENCOUNTER: Primary | ICD-10-CM

## 2025-03-18 DIAGNOSIS — Z02.6 ENCOUNTER RELATED TO WORKER'S COMPENSATION CLAIM: ICD-10-CM
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
## 2025-03-18 PROCEDURE — 99499 UNLISTED E&M SERVICE: CPT | Mod: S$GLB,,, | Performed by: PHYSICIAN ASSISTANT

## 2025-03-18 NOTE — LETTER
[facility] [address] [address]  Phone: [phone]  Fax: [phone]  [facility]: [id number]    Pt Name: Raya Becker  Injury Date: 03/14/2025   Employee ID: [id number] Date of First Treatment: 03/18/2025   Company: [employer]      Appointment Time: 02:45 PM Arrived: 3:00 PM   Provider: Shon Yanes PA-C Time Out: 3:38 PM     Office Treatment:   1. Laceration of right index finger without foreign body without damage to nail, initial encounter    2. Work related injury          Patient Instructions:  (Keep wound clean, dry and covered while at work.  May remove your bandage when you get home.  Clean with soap and water only.  Take antibiotics until completed.  Monitor for signs of infection and return to clinic if you suspect infection.)      Restrictions: Regular Duty (Keep wound clean, dry and covered while working.)     Return Appointment: 3/21/2025 at 2:30 PM    NADIA

## 2025-03-18 NOTE — PROGRESS NOTES
Subjective:      Patient ID: Raya Becker is a 59 y.o. female.    Chief Complaint: Laceration (DOI 03/15/2025 Rt index finger laceration Low )    Patient's place of employment -  [employer]  Patient's job title - [employer]  Date of injury - 03/14/2025  Body part injured including left or right -  Rt Index Finger   Injury Mechanism -  knife  What they were doing when they got hurt - Sharpening a knife and her hand slipped causing injury  What they did immediately after - [facility]  Pain scale right now - 2/10  Low    Provider note:   59-year-old right-hand dominant female presents with laceration to right index finger that occurred 3 days ago while on duty.  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  Patient was seen at urgent care and received primary closure with sutures.  She reports minimal pain at this time.  She denies any numbness or tingling in the finger.  Patient has been compliant with antibiotics.  Patient returned to regular duty today. MEB    Laceration   The incident occurred 3 to 5 days ago. The laceration is located on the Right hand. The laceration mechanism was a clean knife. She reports no foreign bodies present. Her tetanus status is UTD.       Constitution: Negative for fever.   Musculoskeletal:  Negative for joint pain.   Skin:  Positive for laceration. Negative for erythema.   Neurological:  Negative for numbness and tingling.     Objective:     Physical Exam  Vitals and nursing note reviewed.   Constitutional:       General: She is not in acute distress.     Appearance: She is well-developed. She is not diaphoretic.   HENT:      Head: Normocephalic and atraumatic.      Right Ear: Hearing and external ear normal.      Left Ear: Hearing and external ear normal.      Nose: Nose normal. No nasal deformity.   Eyes:      General: Lids are normal. No scleral icterus.     Conjunctiva/sclera: Conjunctivae normal.   Neck:      Trachea: Trachea  normal.   Cardiovascular:      Pulses: Normal pulses.   Pulmonary:      Effort: Pulmonary effort is normal. No respiratory distress.      Breath sounds: No stridor.   Musculoskeletal:      Cervical back: Normal range of motion.   Skin:     General: Skin is warm and dry.      Capillary Refill: Capillary refill takes less than 2 seconds.      Findings: No erythema.   Neurological:      Mental Status: She is alert. She is not disoriented.      GCS: GCS eye subscore is 4. GCS verbal subscore is 5. GCS motor subscore is 6.      Sensory: No sensory deficit.   Psychiatric:         Attention and Perception: She is attentive.         Speech: Speech normal.         Behavior: Behavior normal.            Assessment:      1. Laceration of right index finger without foreign body without damage to nail, initial encounter    2. Work related injury      Plan:     Tetanus up-to-date per immunization record.  Discussed wound care, SSI and need for follow-up.  Patient may return to regular duty.  Return to clinic in 3 days for wound check and probable suture removal.  Patient verbalized understanding.     Patient Instructions:  (Keep wound clean, dry and covered while at work.  May remove your bandage when you get home.  Clean with soap and water only.  Take antibiotics until completed.  Monitor for signs of infection and return to clinic if you suspect infection.)   Restrictions: Regular Duty (Keep wound clean, dry and covered while working.)  Follow up in about 3 days (around 3/21/2025) for Wound Check.

## 2025-03-18 NOTE — LETTER
[facility] [address] [address]  Phone: [phone]  Fax: [phone]  [facility]: [id number]    Pt Name: Raya Becker  Injury Date: 03/14/2025   Employee ID: [id number] Date of First Treatment: 03/18/2025   Company: [employer]      Appointment Time: 02:45 PM Arrived: 3:00 PM   Provider: Shon Yanes PA-C Time Out: 3:38 PM     Office Treatment:   1. Laceration of right index finger without foreign body without damage to nail, initial encounter    2. Work related injury    3. Encounter related to worker's compensation claim          Patient Instructions:  (Keep wound clean, dry and covered while at work.  May remove your bandage when you get home.  Clean with soap and water only.  Take antibiotics until completed.  Monitor for signs of infection and return to clinic if you suspect infection.)      Restrictions: Regular Duty (Keep wound clean, dry and covered while working.)     Return Appointment: 3/21/2025 at 3:15 PM    NADIA

## 2025-03-21 ENCOUNTER — OFFICE VISIT (OUTPATIENT)
Dept: URGENT CARE | Facility: CLINIC | Age: 60
End: 2025-03-21
Payer: OTHER MISCELLANEOUS

## 2025-03-21 VITALS
HEART RATE: 86 BPM | RESPIRATION RATE: 18 BRPM | SYSTOLIC BLOOD PRESSURE: 135 MMHG | WEIGHT: 185 LBS | DIASTOLIC BLOOD PRESSURE: 82 MMHG | TEMPERATURE: 98 F | HEIGHT: 69 IN | OXYGEN SATURATION: 99 % | BODY MASS INDEX: 27.4 KG/M2

## 2025-03-21 DIAGNOSIS — S61.210A LACERATION OF RIGHT INDEX FINGER WITHOUT FOREIGN BODY WITHOUT DAMAGE TO NAIL, INITIAL ENCOUNTER: ICD-10-CM
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
## 2025-03-21 DIAGNOSIS — Z02.6 ENCOUNTER RELATED TO WORKER'S COMPENSATION CLAIM: ICD-10-CM

## 2025-03-21 DIAGNOSIS — Z48.02 VISIT FOR SUTURE REMOVAL: Primary | ICD-10-CM

## 2025-03-21 NOTE — LETTER
[facility] [address] [address]  Phone: [phone]  Fax: [phone]  [facility]: [id number]    Pt Name: Raya Becker  Injury Date:     Employee ID: [id number] Date of Treatment: 03/21/2025   Company: [employer]      Appointment Time: 03:00 PM Arrived: 3:15 PM   Provider: Shon Yanes PA-C Time Out: 3:44 PM     Office Treatment:   1. Visit for suture removal    2. Encounter related to worker's compensation claim    3. Laceration of right index finger without foreign body without damage to nail, initial encounter    4. Work related injury               Restrictions: Regular Duty, Discharged from Occupational Health     Return Appointment: Discharged from [facility].    NADIA

## 2025-03-21 NOTE — PROGRESS NOTES
Subjective:      Patient ID: Raya Becker is a 59 y.o. female.    Chief Complaint: Laceration (DOI 03/14/2025 Rt index finger [place])    Patient's place of employment -  [employer]  Patient's job title - [employer]  Date of injury - 03/14/2025  Body part injured including left or right -  Rt Index Finger   Current work status per last visit -  reg duty  Improved, same, or worse - improved  Pain Scale right now (1-10) -  2/10    Laceration     Constitution: Negative for fever.   Musculoskeletal:  Positive for pain.   Skin:  Positive for laceration. Negative for erythema.   Neurological:  Negative for numbness and tingling.     Objective:     Physical Exam  Vitals and nursing note reviewed.   Constitutional:       General: She is not in acute distress.     Appearance: She is well-developed. She is not diaphoretic.   HENT:      Head: Normocephalic and atraumatic.      Right Ear: Hearing and external ear normal.      Left Ear: Hearing and external ear normal.      Nose: Nose normal. No nasal deformity.   Eyes:      General: Lids are normal. No scleral icterus.     Conjunctiva/sclera: Conjunctivae normal.   Neck:      Trachea: Trachea normal.   Cardiovascular:      Pulses: Normal pulses.   Pulmonary:      Effort: Pulmonary effort is normal. No respiratory distress.      Breath sounds: No stridor.   Musculoskeletal:      Right hand: Laceration present. No tenderness. Normal sensation. Normal capillary refill.        Hands:       Cervical back: Normal range of motion.   Skin:     General: Skin is warm and dry.      Capillary Refill: Capillary refill takes less than 2 seconds.      Findings: No erythema.   Neurological:      Mental Status: She is alert. She is not disoriented.      GCS: GCS eye subscore is 4. GCS verbal subscore is 5. GCS motor subscore is 6.      Sensory: No sensory deficit.   Psychiatric:         Attention and Perception: She is attentive.         Speech: Speech normal.         Behavior: Behavior  normal.      Assessment:      1. Visit for suture removal    2. Encounter related to worker's compensation claim    3. Laceration of right index finger without foreign body without damage to nail, initial encounter    4. Work related injury      Plan:     Suture Removal    Date/Time: 3/21/2025 3:15 PM  Location procedure was performed: [facility]    Performed by: Shon Yanes PA-C  Authorized by: Shon Yanes PA-C  Pre-operative diagnosis: laceration right index finger  Post-operative diagnosis: same  Body area: upper extremity  Location details: right index finger  Wound Appearance: clean, well healed, normal color, nontender and no drainage  Sutures Removed: 4  Staples Removed: 0  Post-removal: bandaid applied  Facility: sutures placed in this facility  Complications: No  Estimated blood loss (mL): 0  Patient tolerance: Patient tolerated the procedure well with no immediate complications                 Restrictions: Regular Duty, Discharged from Occupational Health  Follow up if symptoms worsen or fail to improve.       133

## 2025-03-21 NOTE — PROCEDURES
Suture Removal    Date/Time: 3/21/2025 3:15 PM  Location procedure was performed: [facility]    Performed by: Shon Yanes PA-C  Authorized by: Shon Yanes PA-C  Pre-operative diagnosis: laceration right index finger  Post-operative diagnosis: same  Body area: upper extremity  Location details: right index finger  Wound Appearance: clean, well healed, normal color, nontender and no drainage  Sutures Removed: 4  Staples Removed: 0  Post-removal: bandaid applied  Facility: sutures placed in this facility  Complications: No  Estimated blood loss (mL): 0  Patient tolerance: Patient tolerated the procedure well with no immediate complications

## 2025-07-30 DIAGNOSIS — F17.210 CIGARETTE NICOTINE DEPENDENCE WITHOUT COMPLICATION: Primary | ICD-10-CM

## 2025-08-01 ENCOUNTER — HOSPITAL ENCOUNTER (OUTPATIENT)
Dept: RADIOLOGY | Facility: OTHER | Age: 60
Discharge: HOME | End: 2025-08-01
Attending: FAMILY MEDICINE
Payer: COMMERCIAL

## 2025-08-01 DIAGNOSIS — F17.210 CIGARETTE NICOTINE DEPENDENCE WITHOUT COMPLICATION: ICD-10-CM

## 2025-08-01 PROCEDURE — 71271 CT THORAX LUNG CANCER SCR C-: CPT | Mod: 26,,, | Performed by: RADIOLOGY

## 2025-08-01 PROCEDURE — 71271 CT THORAX LUNG CANCER SCR C-: CPT | Mod: TC

## 2025-08-05 DIAGNOSIS — Z12.31 ENCOUNTER FOR SCREENING MAMMOGRAM FOR MALIGNANT NEOPLASM OF BREAST: Primary | ICD-10-CM
